# Patient Record
Sex: MALE | Race: WHITE
[De-identification: names, ages, dates, MRNs, and addresses within clinical notes are randomized per-mention and may not be internally consistent; named-entity substitution may affect disease eponyms.]

---

## 2018-02-03 ENCOUNTER — HOSPITAL ENCOUNTER (INPATIENT)
Dept: HOSPITAL 91 - TEL | Age: 33
LOS: 7 days | Discharge: HOME | DRG: 296 | End: 2018-02-10
Payer: COMMERCIAL

## 2018-02-03 DIAGNOSIS — E87.4: ICD-10-CM

## 2018-02-03 DIAGNOSIS — I49.01: ICD-10-CM

## 2018-02-03 DIAGNOSIS — F12.929: ICD-10-CM

## 2018-02-03 DIAGNOSIS — R73.9: ICD-10-CM

## 2018-02-03 DIAGNOSIS — F17.200: ICD-10-CM

## 2018-02-03 DIAGNOSIS — D69.6: ICD-10-CM

## 2018-02-03 DIAGNOSIS — F14.129: ICD-10-CM

## 2018-02-03 DIAGNOSIS — I42.7: ICD-10-CM

## 2018-02-03 DIAGNOSIS — E83.39: ICD-10-CM

## 2018-02-03 DIAGNOSIS — J96.01: ICD-10-CM

## 2018-02-03 DIAGNOSIS — I46.8: Primary | ICD-10-CM

## 2018-02-03 DIAGNOSIS — I50.21: ICD-10-CM
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
## 2018-02-03 DIAGNOSIS — N17.9: ICD-10-CM

## 2018-02-03 DIAGNOSIS — J69.0: ICD-10-CM

## 2018-02-03 DIAGNOSIS — R40.2432: ICD-10-CM

## 2018-02-03 DIAGNOSIS — F10.129: ICD-10-CM

## 2018-02-03 DIAGNOSIS — E83.42: ICD-10-CM

## 2018-02-03 DIAGNOSIS — I11.0: ICD-10-CM

## 2018-02-03 DIAGNOSIS — R41.3: ICD-10-CM

## 2018-02-03 PROCEDURE — 92526 ORAL FUNCTION THERAPY: CPT

## 2018-02-03 PROCEDURE — 94003 VENT MGMT INPAT SUBQ DAY: CPT

## 2018-02-03 PROCEDURE — 87340 HEPATITIS B SURFACE AG IA: CPT

## 2018-02-03 PROCEDURE — 93306 TTE W/DOPPLER COMPLETE: CPT

## 2018-02-03 PROCEDURE — 94664 DEMO&/EVAL PT USE INHALER: CPT

## 2018-02-03 PROCEDURE — 93005 ELECTROCARDIOGRAM TRACING: CPT

## 2018-02-03 PROCEDURE — 80306 DRUG TEST PRSMV INSTRMNT: CPT

## 2018-02-03 PROCEDURE — 97530 THERAPEUTIC ACTIVITIES: CPT

## 2018-02-03 PROCEDURE — 92610 EVALUATE SWALLOWING FUNCTION: CPT

## 2018-02-03 PROCEDURE — 87070 CULTURE OTHR SPECIMN AEROBIC: CPT

## 2018-02-03 PROCEDURE — 87040 BLOOD CULTURE FOR BACTERIA: CPT

## 2018-02-03 PROCEDURE — 89220 SPUTUM SPECIMEN COLLECTION: CPT

## 2018-02-03 PROCEDURE — 97116 GAIT TRAINING THERAPY: CPT

## 2018-02-03 PROCEDURE — 71045 X-RAY EXAM CHEST 1 VIEW: CPT

## 2018-02-03 PROCEDURE — 84132 ASSAY OF SERUM POTASSIUM: CPT

## 2018-02-03 PROCEDURE — 83036 HEMOGLOBIN GLYCOSYLATED A1C: CPT

## 2018-02-03 PROCEDURE — 31500 INSERT EMERGENCY AIRWAY: CPT

## 2018-02-03 PROCEDURE — 86703 HIV-1/HIV-2 1 RESULT ANTBDY: CPT

## 2018-02-03 PROCEDURE — 96374 THER/PROPH/DIAG INJ IV PUSH: CPT

## 2018-02-03 PROCEDURE — 81001 URINALYSIS AUTO W/SCOPE: CPT

## 2018-02-03 PROCEDURE — 80053 COMPREHEN METABOLIC PANEL: CPT

## 2018-02-03 PROCEDURE — 84100 ASSAY OF PHOSPHORUS: CPT

## 2018-02-03 PROCEDURE — 70450 CT HEAD/BRAIN W/O DYE: CPT

## 2018-02-03 PROCEDURE — 99291 CRITICAL CARE FIRST HOUR: CPT

## 2018-02-03 PROCEDURE — 5A12012 PERFORMANCE OF CARDIAC OUTPUT, SINGLE, MANUAL: ICD-10-PCS

## 2018-02-03 PROCEDURE — 82550 ASSAY OF CK (CPK): CPT

## 2018-02-03 PROCEDURE — 80307 DRUG TEST PRSMV CHEM ANLYZR: CPT

## 2018-02-03 PROCEDURE — 36415 COLL VENOUS BLD VENIPUNCTURE: CPT

## 2018-02-03 PROCEDURE — 86704 HEP B CORE ANTIBODY TOTAL: CPT

## 2018-02-03 PROCEDURE — 36600 WITHDRAWAL OF ARTERIAL BLOOD: CPT

## 2018-02-03 PROCEDURE — 97110 THERAPEUTIC EXERCISES: CPT

## 2018-02-03 PROCEDURE — 83735 ASSAY OF MAGNESIUM: CPT

## 2018-02-03 PROCEDURE — 80076 HEPATIC FUNCTION PANEL: CPT

## 2018-02-03 PROCEDURE — 80048 BASIC METABOLIC PNL TOTAL CA: CPT

## 2018-02-03 PROCEDURE — 97166 OT EVAL MOD COMPLEX 45 MIN: CPT

## 2018-02-03 PROCEDURE — 87081 CULTURE SCREEN ONLY: CPT

## 2018-02-03 PROCEDURE — 94770: CPT

## 2018-02-03 PROCEDURE — 96375 TX/PRO/DX INJ NEW DRUG ADDON: CPT

## 2018-02-03 PROCEDURE — 86803 HEPATITIS C AB TEST: CPT

## 2018-02-03 PROCEDURE — 76705 ECHO EXAM OF ABDOMEN: CPT

## 2018-02-03 PROCEDURE — 84484 ASSAY OF TROPONIN QUANT: CPT

## 2018-02-03 PROCEDURE — 94002 VENT MGMT INPAT INIT DAY: CPT

## 2018-02-03 PROCEDURE — 82803 BLOOD GASES ANY COMBINATION: CPT

## 2018-02-03 PROCEDURE — 85025 COMPLETE CBC W/AUTO DIFF WBC: CPT

## 2018-02-03 PROCEDURE — 97163 PT EVAL HIGH COMPLEX 45 MIN: CPT

## 2018-02-03 PROCEDURE — 82962 GLUCOSE BLOOD TEST: CPT

## 2018-02-03 PROCEDURE — 86706 HEP B SURFACE ANTIBODY: CPT

## 2018-02-03 PROCEDURE — 82553 CREATINE MB FRACTION: CPT

## 2018-02-03 PROCEDURE — 85730 THROMBOPLASTIN TIME PARTIAL: CPT

## 2018-02-03 PROCEDURE — 85610 PROTHROMBIN TIME: CPT

## 2018-02-03 PROCEDURE — 94640 AIRWAY INHALATION TREATMENT: CPT

## 2018-02-04 ENCOUNTER — HOSPITAL ENCOUNTER (INPATIENT)
Age: 33
LOS: 6 days | Discharge: HOME | DRG: 296 | End: 2018-02-10

## 2018-02-04 LAB
ABNORMAL IP MESSAGE: 1
ABNORMAL IP MESSAGE: 1
ACETAMINOPHEN: < 10 UG/ML (ref 10–30)
ADD MAN DIFF?: NO
ADD MAN DIFF?: NO
ADD UMIC: YES
ALANINE AMINOTRANSFERASE: 142 IU/L (ref 13–69)
ALANINE AMINOTRANSFERASE: 182 IU/L (ref 13–69)
ALANINE AMINOTRANSFERASE: 185 IU/L (ref 13–69)
ALANINE AMINOTRANSFERASE: 208 IU/L (ref 13–69)
ALBUMIN/GLOBULIN RATIO: 1.21
ALBUMIN/GLOBULIN RATIO: 1.36
ALBUMIN/GLOBULIN RATIO: 1.48
ALBUMIN/GLOBULIN RATIO: 1.53
ALBUMIN: 3.4 G/DL (ref 3.3–4.9)
ALBUMIN: 4 G/DL (ref 3.3–4.9)
ALBUMIN: 4 G/DL (ref 3.3–4.9)
ALBUMIN: 4.5 G/DL (ref 3.3–4.9)
ALKALINE PHOSPHATASE: 37 IU/L (ref 42–121)
ALKALINE PHOSPHATASE: 44 IU/L (ref 42–121)
ALKALINE PHOSPHATASE: 56 IU/L (ref 42–121)
ALKALINE PHOSPHATASE: 61 IU/L (ref 42–121)
ALLEN TEST: (no result)
ANION GAP: 17 (ref 8–16)
ANION GAP: 18 (ref 8–16)
ANION GAP: 18 (ref 8–16)
ANION GAP: 29 (ref 8–16)
ARTERIAL BASE EXCESS: -12.7 MMOL/L (ref -3–3)
ARTERIAL BASE EXCESS: -7.1 MMOL/L (ref -3–3)
ARTERIAL BASE EXCESS: -7.7 MMOL/L (ref -3–3)
ARTERIAL BASE EXCESS: -8.1 MMOL/L (ref -3–3)
ARTERIAL BLOOD GAS OXYGEN SAT: 97.4 MMHG (ref 95–98)
ARTERIAL BLOOD GAS OXYGEN SAT: 98.3 MMHG (ref 95–98)
ARTERIAL BLOOD GAS OXYGEN SAT: 99.3 MMHG (ref 95–98)
ARTERIAL BLOOD GAS OXYGEN SAT: 99.3 MMHG (ref 95–98)
ARTERIAL COHB: 0.3 % (ref 0–3)
ARTERIAL FRACTION OF OXYHGB: 96.8 % (ref 93–99)
ARTERIAL FRACTION OF OXYHGB: 97.8 % (ref 93–99)
ARTERIAL FRACTION OF OXYHGB: 98.7 % (ref 93–99)
ARTERIAL FRACTION OF OXYHGB: 98.7 % (ref 93–99)
ARTERIAL HCO3: 15.3 MMOL/L (ref 22–26)
ARTERIAL HCO3: 20.5 MMOL/L (ref 22–26)
ARTERIAL HCO3: 21.1 MMOL/L (ref 22–26)
ARTERIAL HCO3: 21.7 MMOL/L (ref 22–26)
ARTERIAL METHB: 0.2 % (ref 0–1.5)
ARTERIAL METHB: 0.3 % (ref 0–1.5)
ARTERIAL PCO2: 42.6 MMHG (ref 35–45)
ARTERIAL PCO2: 43.6 MMHG (ref 35–45)
ARTERIAL PCO2: 45 MMHG (ref 35–45)
ARTERIAL PCO2: 59.3 MMHG (ref 35–45)
ARTERIAL TOTAL HEMGLOBIN: 14.4 G/DL (ref 12–18)
ARTERIAL TOTAL HEMGLOBIN: 17 G/DL (ref 12–18)
ARTERIAL TOTAL HEMGLOBIN: 17.2 G/DL (ref 12–18)
ARTERIAL TOTAL HEMGLOBIN: 17.3 G/DL (ref 12–18)
ASPARTATE AMINO TRANSFERASE: 188 IU/L (ref 15–46)
ASPARTATE AMINO TRANSFERASE: 207 IU/L (ref 15–46)
ASPARTATE AMINO TRANSFERASE: 212 IU/L (ref 15–46)
ASPARTATE AMINO TRANSFERASE: 281 IU/L (ref 15–46)
BASOPHIL #: 0 10^3/UL (ref 0–0.1)
BASOPHIL #: 0 10^3/UL (ref 0–0.1)
BASOPHILS %: 0.3 % (ref 0–2)
BASOPHILS %: 0.4 % (ref 0–2)
BILIRUBIN,DIRECT: 0 MG/DL (ref 0–0.2)
BILIRUBIN,TOTAL: 0.1 MG/DL (ref 0.2–1.3)
BILIRUBIN,TOTAL: 0.1 MG/DL (ref 0.2–1.3)
BILIRUBIN,TOTAL: 0.3 MG/DL (ref 0.2–1.3)
BILIRUBIN,TOTAL: 0.4 MG/DL (ref 0.2–1.3)
BLOOD GAS HIGH PEEP SETTING: 26 CMH2O
BLOOD GAS LOW PEEP SETTING: 5 CMH2O
BLOOD GAS MEAN AIRWAY PRESSURE: 12
BLOOD GAS PIP: 26
BLOOD GAS PIP: 26
BLOOD GAS PIP: 35
BLOOD GAS TIDAL VOLUME: 600 ML
BLOOD UREA NITROGEN: 14 MG/DL (ref 7–20)
BLOOD UREA NITROGEN: 15 MG/DL (ref 7–20)
BLOOD UREA NITROGEN: 16 MG/DL (ref 7–20)
BLOOD UREA NITROGEN: 17 MG/DL (ref 7–20)
CALCIUM: 7.9 MG/DL (ref 8.4–10.2)
CALCIUM: 8.1 MG/DL (ref 8.4–10.2)
CALCIUM: 8.5 MG/DL (ref 8.4–10.2)
CALCIUM: 9.3 MG/DL (ref 8.4–10.2)
CARBON DIOXIDE: 19 MMOL/L (ref 21–31)
CARBON DIOXIDE: 22 MMOL/L (ref 21–31)
CARBON DIOXIDE: 24 MMOL/L (ref 21–31)
CARBON DIOXIDE: 24 MMOL/L (ref 21–31)
CHLORIDE: 101 MMOL/L (ref 97–110)
CHLORIDE: 105 MMOL/L (ref 97–110)
CHLORIDE: 110 MMOL/L (ref 97–110)
CHLORIDE: 111 MMOL/L (ref 97–110)
CK INDEX: 0.7
CK-MB: 2.08 NG/ML (ref 0–2.4)
CREATINE KINASE: 278 IU/L (ref 23–200)
CREATININE: 0.9 MG/DL (ref 0.61–1.24)
CREATININE: 0.96 MG/DL (ref 0.61–1.24)
CREATININE: 1.21 MG/DL (ref 0.61–1.24)
CREATININE: 1.49 MG/DL (ref 0.61–1.24)
EOSINOPHILS #: 0 10^3/UL (ref 0–0.5)
EOSINOPHILS #: 0.1 10^3/UL (ref 0–0.5)
EOSINOPHILS %: 0 % (ref 0–7)
EOSINOPHILS %: 0.8 % (ref 0–7)
ETHANOL: 144 MG/DL
FIO2: 100 %
GLOBULIN: 2.6 G/DL (ref 1.3–3.2)
GLOBULIN: 2.7 G/DL (ref 1.3–3.2)
GLOBULIN: 2.8 G/DL (ref 1.3–3.2)
GLOBULIN: 3.3 G/DL (ref 1.3–3.2)
GLUCOSE: 122 MG/DL (ref 70–220)
GLUCOSE: 136 MG/DL (ref 70–220)
GLUCOSE: 162 MG/DL (ref 70–220)
GLUCOSE: 180 MG/DL (ref 70–220)
HEMATOCRIT: 42.9 % (ref 42–52)
HEMATOCRIT: 50 % (ref 42–52)
HEMOGLOBIN: 14.1 G/DL (ref 14–18)
HEMOGLOBIN: 16.7 G/DL (ref 14–18)
HEPATITIS B SURFACE ANTIBODY: POSITIVE
HEPATITIS B SURFACE ANTIGEN: NEGATIVE
HEPATITIS C VIRAL ANTIBODY: NEGATIVE
HIV 1&2 ANTIBODY: NEGATIVE
INR: 1.03
LYMPHOCYTES #: 0.3 10^3/UL (ref 0.8–2.9)
LYMPHOCYTES #: 5.2 10^3/UL (ref 0.8–2.9)
LYMPHOCYTES %: 4.5 % (ref 15–51)
LYMPHOCYTES %: 53.3 % (ref 15–51)
MAGNESIUM: 1.4 MG/DL (ref 1.7–2.5)
MAGNESIUM: 1.5 MG/DL (ref 1.7–2.5)
MAGNESIUM: 1.7 MG/DL (ref 1.7–2.5)
MEAN CORPUSCULAR HEMOGLOBIN: 32.1 PG (ref 29–33)
MEAN CORPUSCULAR HEMOGLOBIN: 32.1 PG (ref 29–33)
MEAN CORPUSCULAR HGB CONC: 32.9 G/DL (ref 32–37)
MEAN CORPUSCULAR HGB CONC: 33.4 G/DL (ref 32–37)
MEAN CORPUSCULAR VOLUME: 96 FL (ref 82–101)
MEAN CORPUSCULAR VOLUME: 97.7 FL (ref 82–101)
MEAN PLATELET VOLUME: 10.1 FL (ref 7.4–10.4)
MEAN PLATELET VOLUME: 9.9 FL (ref 7.4–10.4)
MODE: (no result)
MONOCYTE #: 0.4 10^3/UL (ref 0.3–0.9)
MONOCYTE #: 0.4 10^3/UL (ref 0.3–0.9)
MONOCYTES %: 3.7 % (ref 0–11)
MONOCYTES %: 4.7 % (ref 0–11)
NEUTROPHIL #: 4 10^3/UL (ref 1.6–7.5)
NEUTROPHIL #: 6.8 10^3/UL (ref 1.6–7.5)
NEUTROPHILS %: 41.3 % (ref 39–77)
NEUTROPHILS %: 90.1 % (ref 39–77)
NUCLEATED RED BLOOD CELLS #: 0 10^3/UL (ref 0–0)
NUCLEATED RED BLOOD CELLS #: 0 10^3/UL (ref 0–0)
NUCLEATED RED BLOOD CELLS%: 0 /100WBC (ref 0–0)
NUCLEATED RED BLOOD CELLS%: 0 /100WBC (ref 0–0)
O2 A-A PPRESDIFF RESPIRATORY: 381.1 MMHG (ref 7–24)
O2 A-A PPRESDIFF RESPIRATORY: 443.2 MMHG (ref 7–24)
O2 A-A PPRESDIFF RESPIRATORY: 502.3 MMHG (ref 7–24)
O2 A-A PPRESDIFF RESPIRATORY: 533.9 MMHG (ref 7–24)
PARTIAL THROMBOPLASTIN TIME: 27.4 SEC (ref 25–35)
PHOSPHORUS: 3.4 MG/DL (ref 2.5–4.9)
PHOSPHORUS: 4.3 MG/DL (ref 2.5–4.9)
PLATELET COUNT: 210 10^3/UL (ref 140–415)
PLATELET COUNT: 214 10^3/UL (ref 140–415)
POSITIVE DIFF: (no result)
POSITIVE DIFF: (no result)
POTASSIUM: 3.2 MMOL/L (ref 3.5–5.1)
POTASSIUM: 3.7 MMOL/L (ref 3.5–5.1)
POTASSIUM: 3.8 MMOL/L (ref 3.5–5.1)
POTASSIUM: 4.6 MMOL/L (ref 3.5–5.1)
PROTIME: 13.6 SEC (ref 11.9–14.9)
PT RATIO: 1.1
RED BLOOD COUNT: 4.39 10^6/UL (ref 4.7–6.1)
RED BLOOD COUNT: 5.21 10^6/UL (ref 4.7–6.1)
RED CELL DISTRIBUTION WIDTH: 12 % (ref 11.5–14.5)
RED CELL DISTRIBUTION WIDTH: 12.1 % (ref 11.5–14.5)
SALICYLATE: < 1 MG/DL (ref 5–30)
SODIUM: 142 MMOL/L (ref 135–144)
SODIUM: 146 MMOL/L (ref 135–144)
SODIUM: 146 MMOL/L (ref 135–144)
SODIUM: 148 MMOL/L (ref 135–144)
TOTAL PROTEIN: 6.2 G/DL (ref 6.1–8.1)
TOTAL PROTEIN: 6.6 G/DL (ref 6.1–8.1)
TOTAL PROTEIN: 6.7 G/DL (ref 6.1–8.1)
TOTAL PROTEIN: 7.8 G/DL (ref 6.1–8.1)
TROPONIN-I: 0.02 NG/ML (ref 0–0.12)
TROPONIN-I: 0.03 NG/ML (ref 0–0.12)
UR ASCORBIC ACID: NEGATIVE MG/DL
UR BILIRUBIN (DIP): NEGATIVE MG/DL
UR BLOOD (DIP): (no result) MG/DL
UR CLARITY: (no result)
UR COLOR: YELLOW
UR GLUCOSE (DIP): (no result) MG/DL
UR KETONES (DIP): NEGATIVE MG/DL
UR LEUKOCYTE ESTERASE (DIP): NEGATIVE LEU/UL
UR MUCUS: (no result) /HPF
UR NITRITE (DIP): NEGATIVE MG/DL
UR PH (DIP): 7 (ref 5–9)
UR RBC: 43 /HPF (ref 0–5)
UR SPECIFIC GRAVITY (DIP): 1.01 (ref 1–1.03)
UR TOTAL PROTEIN (DIP): (no result) MG/DL
UR UROBILINOGEN (DIP): NEGATIVE MG/DL
UR WBC: 22 /HPF (ref 0–5)
WHITE BLOOD COUNT: 7.5 10^3/UL (ref 4.8–10.8)
WHITE BLOOD COUNT: 9.7 10^3/UL (ref 4.8–10.8)

## 2018-02-04 PROCEDURE — 5A1945Z RESPIRATORY VENTILATION, 24-96 CONSECUTIVE HOURS: ICD-10-PCS

## 2018-02-04 PROCEDURE — 0BH17EZ INSERTION OF ENDOTRACHEAL AIRWAY INTO TRACHEA, VIA NATURAL OR ARTIFICIAL OPENING: ICD-10-PCS

## 2018-02-04 PROCEDURE — 06HM33Z INSERTION OF INFUSION DEVICE INTO RIGHT FEMORAL VEIN, PERCUTANEOUS APPROACH: ICD-10-PCS

## 2018-02-04 RX ADMIN — CEFTRIAXONE 1 MLS/HR: 1 INJECTION, SOLUTION INTRAVENOUS at 01:00

## 2018-02-04 RX ADMIN — ASCORBIC ACID, VITAMIN A PALMITATE, CHOLECALCIFEROL, THIAMINE HYDROCHLORIDE, RIBOFLAVIN-5 PHOSPHATE SODIUM, PYRIDOXINE HYDROCHLORIDE, NIACINAMIDE, DEXPANTHENOL, ALPHA-TOCOPHEROL ACETATE, VITAMIN K1, FOLIC ACID, BIOTIN, CYANOCOBALAMIN 1 MLS/HR: 200; 3300; 200; 6; 3.6; 6; 40; 15; 10; 150; 600; 60; 5 INJECTION, SOLUTION INTRAVENOUS at 16:56

## 2018-02-04 RX ADMIN — CARBOXYMETHYLCELLULOSE SODIUM 1 DROP: 10 GEL OPHTHALMIC at 17:50

## 2018-02-04 RX ADMIN — SPIRONOLACTONE 1 MG: 25 TABLET, FILM COATED ORAL at 11:30

## 2018-02-04 RX ADMIN — VECURONIUM BROMIDE 1 MLS/HR: 1 INJECTION, POWDER, LYOPHILIZED, FOR SOLUTION INTRAVENOUS at 04:39

## 2018-02-04 RX ADMIN — PROPOFOL 1 MLS/HR: 10 INJECTION, EMULSION INTRAVENOUS at 01:35

## 2018-02-04 RX ADMIN — CEFEPIME 1 MLS/HR: 1 INJECTION, POWDER, FOR SOLUTION INTRAMUSCULAR; INTRAVENOUS at 21:39

## 2018-02-04 RX ADMIN — LIDOCAINE HYDROCHLORIDE 1 MLS/HR: 10 INJECTION, SOLUTION EPIDURAL; INFILTRATION; INTRACAUDAL; PERINEURAL at 00:00

## 2018-02-04 RX ADMIN — VASOPRESSIN 1 MLS/HR: 20 INJECTION, SOLUTION INTRAMUSCULAR; SUBCUTANEOUS at 20:06

## 2018-02-04 RX ADMIN — FAMOTIDINE 1 MG: 10 INJECTION, SOLUTION INTRAVENOUS at 21:38

## 2018-02-04 RX ADMIN — MINERAL OIL, PETROLATUM 1 APPLIC: 425; 568 OINTMENT OPHTHALMIC at 17:51

## 2018-02-04 RX ADMIN — MINERAL OIL, PETROLATUM 1 APPLIC: 425; 568 OINTMENT OPHTHALMIC at 12:00

## 2018-02-04 RX ADMIN — THIAMINE HYDROCHLORIDE 1 MLS/HR: 100 INJECTION, SOLUTION INTRAMUSCULAR; INTRAVENOUS at 08:41

## 2018-02-04 RX ADMIN — VECURONIUM BROMIDE 1 MG: 1 INJECTION, POWDER, LYOPHILIZED, FOR SOLUTION INTRAVENOUS at 03:53

## 2018-02-04 RX ADMIN — MINERAL OIL, PETROLATUM 1 APPLIC: 425; 568 OINTMENT OPHTHALMIC at 23:36

## 2018-02-04 RX ADMIN — VALSARTAN 1 MG: 80 TABLET, FILM COATED ORAL at 11:30

## 2018-02-04 RX ADMIN — PROPOFOL 1 MLS/HR: 10 INJECTION, EMULSION INTRAVENOUS at 22:34

## 2018-02-04 RX ADMIN — CARBOXYMETHYLCELLULOSE SODIUM 1 DROP: 10 GEL OPHTHALMIC at 23:35

## 2018-02-04 RX ADMIN — ASCORBIC ACID, VITAMIN A PALMITATE, CHOLECALCIFEROL, THIAMINE HYDROCHLORIDE, RIBOFLAVIN-5 PHOSPHATE SODIUM, PYRIDOXINE HYDROCHLORIDE, NIACINAMIDE, DEXPANTHENOL, ALPHA-TOCOPHEROL ACETATE, VITAMIN K1, FOLIC ACID, BIOTIN, CYANOCOBALAMIN 1 MLS/HR: 200; 3300; 200; 6; 3.6; 6; 40; 15; 10; 150; 600; 60; 5 INJECTION, SOLUTION INTRAVENOUS at 19:49

## 2018-02-04 RX ADMIN — THIAMINE HYDROCHLORIDE 1 MLS/HR: 100 INJECTION, SOLUTION INTRAMUSCULAR; INTRAVENOUS at 08:00

## 2018-02-04 RX ADMIN — VASOPRESSIN 1 MLS/HR: 20 INJECTION, SOLUTION INTRAMUSCULAR; SUBCUTANEOUS at 06:54

## 2018-02-04 RX ADMIN — CARBOXYMETHYLCELLULOSE SODIUM 1 DROP: 10 GEL OPHTHALMIC at 12:48

## 2018-02-04 RX ADMIN — FAMOTIDINE 1 MG: 10 INJECTION, SOLUTION INTRAVENOUS at 08:40

## 2018-02-04 RX ADMIN — MAGNESIUM SULFATE HEPTAHYDRATE 1 MLS/HR: 40 INJECTION, SOLUTION INTRAVENOUS at 19:44

## 2018-02-04 RX ADMIN — DILTIAZEM HYDROCHLORIDE 1 MG: 5 INJECTION INTRAVENOUS at 03:53

## 2018-02-04 RX ADMIN — CEFEPIME 1 MLS/HR: 1 INJECTION, POWDER, FOR SOLUTION INTRAMUSCULAR; INTRAVENOUS at 09:35

## 2018-02-04 RX ADMIN — CLINDAMYCIN IN 5 PERCENT DEXTROSE 1 MLS/HR: 18 INJECTION, SOLUTION INTRAVENOUS at 01:30

## 2018-02-04 RX ADMIN — ASCORBIC ACID, VITAMIN A PALMITATE, CHOLECALCIFEROL, THIAMINE HYDROCHLORIDE, RIBOFLAVIN-5 PHOSPHATE SODIUM, PYRIDOXINE HYDROCHLORIDE, NIACINAMIDE, DEXPANTHENOL, ALPHA-TOCOPHEROL ACETATE, VITAMIN K1, FOLIC ACID, BIOTIN, CYANOCOBALAMIN 1 MLS/HR: 200; 3300; 200; 6; 3.6; 6; 40; 15; 10; 150; 600; 60; 5 INJECTION, SOLUTION INTRAVENOUS at 07:59

## 2018-02-05 LAB
ADD MAN DIFF?: YES
ALANINE AMINOTRANSFERASE: 115 IU/L (ref 13–69)
ALANINE AMINOTRANSFERASE: 133 IU/L (ref 13–69)
ALANINE AMINOTRANSFERASE: 146 IU/L (ref 13–69)
ALANINE AMINOTRANSFERASE: 167 IU/L (ref 13–69)
ALBUMIN/GLOBULIN RATIO: 1.06
ALBUMIN/GLOBULIN RATIO: 1.24
ALBUMIN/GLOBULIN RATIO: 1.25
ALBUMIN/GLOBULIN RATIO: 1.33
ALBUMIN: 3.2 G/DL (ref 3.3–4.9)
ALBUMIN: 3.4 G/DL (ref 3.3–4.9)
ALBUMIN: 3.6 G/DL (ref 3.3–4.9)
ALBUMIN: 3.6 G/DL (ref 3.3–4.9)
ALKALINE PHOSPHATASE: 35 IU/L (ref 42–121)
ALKALINE PHOSPHATASE: 36 IU/L (ref 42–121)
ALKALINE PHOSPHATASE: 43 IU/L (ref 42–121)
ALKALINE PHOSPHATASE: 54 IU/L (ref 42–121)
ALLEN TEST: (no result)
ANION GAP: 12 (ref 8–16)
ANION GAP: 12 (ref 8–16)
ANION GAP: 13 (ref 8–16)
ANION GAP: 15 (ref 8–16)
ARTERIAL BASE EXCESS: -2.9 MMOL/L (ref -3–3)
ARTERIAL BASE EXCESS: -4.4 MMOL/L (ref -3–3)
ARTERIAL BASE EXCESS: 0.5 MMOL/L (ref -3–3)
ARTERIAL BLOOD GAS OXYGEN SAT: 96.5 MMHG (ref 95–98)
ARTERIAL BLOOD GAS OXYGEN SAT: 98.5 MMHG (ref 95–98)
ARTERIAL BLOOD GAS OXYGEN SAT: 98.7 MMHG (ref 95–98)
ARTERIAL COHB: 0.1 % (ref 0–3)
ARTERIAL COHB: 0.1 % (ref 0–3)
ARTERIAL COHB: 0.5 % (ref 0–3)
ARTERIAL FRACTION OF OXYHGB: 96.3 % (ref 93–99)
ARTERIAL FRACTION OF OXYHGB: 97.9 % (ref 93–99)
ARTERIAL FRACTION OF OXYHGB: 98.1 % (ref 93–99)
ARTERIAL HCO3: 23.4 MMOL/L (ref 22–26)
ARTERIAL HCO3: 24.4 MMOL/L (ref 22–26)
ARTERIAL HCO3: 26.4 MMOL/L (ref 22–26)
ARTERIAL METHB: 0.1 % (ref 0–1.5)
ARTERIAL METHB: 0.3 % (ref 0–1.5)
ARTERIAL METHB: 0.3 % (ref 0–1.5)
ARTERIAL PCO2: 40 MMHG (ref 35–45)
ARTERIAL PCO2: 46.7 MMHG (ref 35–45)
ARTERIAL PCO2: 49.4 MMHG (ref 35–45)
ARTERIAL TOTAL HEMGLOBIN: 15.9 G/DL (ref 12–18)
ARTERIAL TOTAL HEMGLOBIN: 16.1 G/DL (ref 12–18)
ARTERIAL TOTAL HEMGLOBIN: 17 G/DL (ref 12–18)
ASPARTATE AMINO TRANSFERASE: 133 IU/L (ref 15–46)
ASPARTATE AMINO TRANSFERASE: 142 IU/L (ref 15–46)
ASPARTATE AMINO TRANSFERASE: 89 IU/L (ref 15–46)
ASPARTATE AMINO TRANSFERASE: 94 IU/L (ref 15–46)
BAND NEUTROPHILS #M: 9.1 10^3/UL (ref 0–0.6)
BAND NEUTROPHILS % (M): 55 % (ref 0–4)
BILIRUBIN,DIRECT: 0 MG/DL (ref 0–0.2)
BILIRUBIN,TOTAL: 0.3 MG/DL (ref 0.2–1.3)
BLOOD GAS LOW PEEP SETTING: 5 CMH2O
BLOOD GAS PIP: 19
BLOOD GAS PIP: 26
BLOOD GAS TIDAL VOLUME: 600 ML
BLOOD UREA NITROGEN: 10 MG/DL (ref 7–20)
BLOOD UREA NITROGEN: 10 MG/DL (ref 7–20)
BLOOD UREA NITROGEN: 11 MG/DL (ref 7–20)
BLOOD UREA NITROGEN: 12 MG/DL (ref 7–20)
CALCIUM: 8.6 MG/DL (ref 8.4–10.2)
CALCIUM: 8.6 MG/DL (ref 8.4–10.2)
CALCIUM: 8.7 MG/DL (ref 8.4–10.2)
CALCIUM: 9 MG/DL (ref 8.4–10.2)
CARBON DIOXIDE: 26 MMOL/L (ref 21–31)
CARBON DIOXIDE: 27 MMOL/L (ref 21–31)
CARBON DIOXIDE: 29 MMOL/L (ref 21–31)
CARBON DIOXIDE: 29 MMOL/L (ref 21–31)
CHLORIDE: 104 MMOL/L (ref 97–110)
CHLORIDE: 104 MMOL/L (ref 97–110)
CHLORIDE: 105 MMOL/L (ref 97–110)
CHLORIDE: 106 MMOL/L (ref 97–110)
CREATININE: 0.58 MG/DL (ref 0.61–1.24)
CREATININE: 0.74 MG/DL (ref 0.61–1.24)
CREATININE: 0.76 MG/DL (ref 0.61–1.24)
CREATININE: 0.77 MG/DL (ref 0.61–1.24)
FIO2: 40 %
FIO2: 60 %
FIO2: 60 %
GIANT THROMBO% (M): 1 % (ref 0–0)
GLOBULIN: 2.7 G/DL (ref 1.3–3.2)
GLOBULIN: 2.7 G/DL (ref 1.3–3.2)
GLOBULIN: 2.9 G/DL (ref 1.3–3.2)
GLOBULIN: 3 G/DL (ref 1.3–3.2)
GLUCOSE: 123 MG/DL (ref 70–220)
GLUCOSE: 138 MG/DL (ref 70–220)
GLUCOSE: 88 MG/DL (ref 70–220)
GLUCOSE: 93 MG/DL (ref 70–220)
HEMATOCRIT: 47.5 % (ref 42–52)
HEMOGLOBIN A1C: 5 % (ref 0–5.9)
HEMOGLOBIN: 15.9 G/DL (ref 14–18)
LYMPHOCYTES #M: 0.5 10^3/UL (ref 0.8–2.9)
LYMPHOCYTES % (M): 3 % (ref 15–51)
MAGNESIUM: 1.6 MG/DL (ref 1.7–2.5)
MAGNESIUM: 1.6 MG/DL (ref 1.7–2.5)
MAGNESIUM: 1.9 MG/DL (ref 1.7–2.5)
MAGNESIUM: 1.9 MG/DL (ref 1.7–2.5)
MEAN CORPUSCULAR HEMOGLOBIN: 31.9 PG (ref 29–33)
MEAN CORPUSCULAR HGB CONC: 33.5 G/DL (ref 32–37)
MEAN CORPUSCULAR VOLUME: 95.4 FL (ref 82–101)
MEAN PLATELET VOLUME: 10.8 FL (ref 7.4–10.4)
METAMYELOCYTES #M: 0.1 10^3/UL (ref 0–0)
METAMYELOCYTES %M: 1 % (ref 0–0)
MODE: (no result)
MONOCYTE #M: 0.6 10^3/UL (ref 0.3–0.9)
MONOCYTES % (M): 4 % (ref 0–11)
NUCLEATED RED BLOOD CELLS%: 0 /100WBC (ref 0–0)
O2 A-A PPRESDIFF RESPIRATORY: 147.4 MMHG (ref 7–24)
O2 A-A PPRESDIFF RESPIRATORY: 255.1 MMHG (ref 7–24)
O2 A-A PPRESDIFF RESPIRATORY: 263.1 MMHG (ref 7–24)
PHOSPHORUS: 2.6 MG/DL (ref 2.5–4.9)
PHOSPHORUS: 3.2 MG/DL (ref 2.5–4.9)
PHOSPHORUS: 4.1 MG/DL (ref 2.5–4.9)
PHOSPHORUS: 4.6 MG/DL (ref 2.5–4.9)
PLATELET COUNT: 152 10^3/UL (ref 140–415)
PLATELET ESTIMATE: NORMAL
POIKILOCYTOSIS: (no result) (ref 0–0)
POSITIVE DIFF: (no result)
POTASSIUM: 4.7 MMOL/L (ref 3.5–5.1)
POTASSIUM: 5.1 MMOL/L (ref 3.5–5.1)
POTASSIUM: 5.2 MMOL/L (ref 3.5–5.1)
POTASSIUM: 5.4 MMOL/L (ref 3.5–5.1)
REACTIVE LYMPHOCYTES #M: 0.3 10^3/UL (ref 0–0)
REACTIVE LYMPHOCYTES% (M): 2 % (ref 0–0)
RED BLOOD COUNT: 4.98 10^6/UL (ref 4.7–6.1)
RED CELL DISTRIBUTION WIDTH: 12.5 % (ref 11.5–14.5)
SEG NEUT #M: 7.4 10^3/UL (ref 1.6–7.5)
SEGMENTED NEUTROPHILS (M) %: 35 % (ref 39–77)
SMUDGE%M: 9 % (ref 0–0)
SODIUM: 139 MMOL/L (ref 135–144)
SODIUM: 141 MMOL/L (ref 135–144)
TOTAL PROTEIN: 6.1 G/DL (ref 6.1–8.1)
TOTAL PROTEIN: 6.2 G/DL (ref 6.1–8.1)
TOTAL PROTEIN: 6.3 G/DL (ref 6.1–8.1)
TOTAL PROTEIN: 6.5 G/DL (ref 6.1–8.1)
WHITE BLOOD COUNT: 16.7 10^3/UL (ref 4.8–10.8)

## 2018-02-05 RX ADMIN — VECURONIUM BROMIDE 1 MLS/HR: 1 INJECTION, POWDER, LYOPHILIZED, FOR SOLUTION INTRAVENOUS at 05:16

## 2018-02-05 RX ADMIN — DEXTROSE MONOHYDRATE 1 ML: 500 INJECTION PARENTERAL at 13:37

## 2018-02-05 RX ADMIN — ACETAMINOPHEN 1 MG: 650 SUPPOSITORY RECTAL at 20:23

## 2018-02-05 RX ADMIN — FAMOTIDINE 1 MG: 20 TABLET ORAL at 20:41

## 2018-02-05 RX ADMIN — CEFEPIME 1 MLS/HR: 1 INJECTION, POWDER, FOR SOLUTION INTRAMUSCULAR; INTRAVENOUS at 08:55

## 2018-02-05 RX ADMIN — ASCORBIC ACID, VITAMIN A PALMITATE, CHOLECALCIFEROL, THIAMINE HYDROCHLORIDE, RIBOFLAVIN-5 PHOSPHATE SODIUM, PYRIDOXINE HYDROCHLORIDE, NIACINAMIDE, DEXPANTHENOL, ALPHA-TOCOPHEROL ACETATE, VITAMIN K1, FOLIC ACID, BIOTIN, CYANOCOBALAMIN 1 MLS/HR: 200; 3300; 200; 6; 3.6; 6; 40; 15; 10; 150; 600; 60; 5 INJECTION, SOLUTION INTRAVENOUS at 01:54

## 2018-02-05 RX ADMIN — DEXTROSE MONOHYDRATE 1 ML: 500 INJECTION PARENTERAL at 15:43

## 2018-02-05 RX ADMIN — VALSARTAN 1 MG: 80 TABLET, FILM COATED ORAL at 09:00

## 2018-02-05 RX ADMIN — SPIRONOLACTONE 1 MG: 25 TABLET, FILM COATED ORAL at 09:00

## 2018-02-05 RX ADMIN — CARBOXYMETHYLCELLULOSE SODIUM 1 DROP: 10 GEL OPHTHALMIC at 11:49

## 2018-02-05 RX ADMIN — CARBOXYMETHYLCELLULOSE SODIUM 1 DROP: 10 GEL OPHTHALMIC at 17:09

## 2018-02-05 RX ADMIN — PROPOFOL 1 MLS/HR: 10 INJECTION, EMULSION INTRAVENOUS at 08:33

## 2018-02-05 RX ADMIN — ACETAMINOPHEN 1 MG: 650 SUPPOSITORY RECTAL at 08:47

## 2018-02-05 RX ADMIN — FAMOTIDINE 1 MG: 10 INJECTION, SOLUTION INTRAVENOUS at 09:10

## 2018-02-05 RX ADMIN — ACETAMINOPHEN 1 MG: 160 SOLUTION ORAL at 14:30

## 2018-02-05 RX ADMIN — ACETAMINOPHEN 1 MG: 650 SUPPOSITORY RECTAL at 14:23

## 2018-02-05 RX ADMIN — MINERAL OIL, PETROLATUM 1 APPLIC: 425; 568 OINTMENT OPHTHALMIC at 11:49

## 2018-02-05 RX ADMIN — ACETAMINOPHEN 1 MG: 160 SOLUTION ORAL at 08:30

## 2018-02-05 RX ADMIN — Medication 1 MLS/HR: at 22:21

## 2018-02-05 RX ADMIN — AMPICILLIN SODIUM AND SULBACTAM SODIUM 1 MLS/HR: 2; 1 INJECTION, POWDER, FOR SOLUTION INTRAVENOUS at 17:27

## 2018-02-05 RX ADMIN — MINERAL OIL, PETROLATUM 1 APPLIC: 425; 568 OINTMENT OPHTHALMIC at 17:10

## 2018-02-05 RX ADMIN — PROPOFOL 1 MLS/HR: 10 INJECTION, EMULSION INTRAVENOUS at 14:57

## 2018-02-05 RX ADMIN — CARBOXYMETHYLCELLULOSE SODIUM 1 DROP: 10 GEL OPHTHALMIC at 05:14

## 2018-02-05 RX ADMIN — PROPOFOL 1 MLS/HR: 10 INJECTION, EMULSION INTRAVENOUS at 21:21

## 2018-02-05 RX ADMIN — ACETAMINOPHEN 1 MG: 160 SOLUTION ORAL at 20:30

## 2018-02-05 RX ADMIN — THIAMINE HYDROCHLORIDE 1 MLS/HR: 100 INJECTION, SOLUTION INTRAMUSCULAR; INTRAVENOUS at 09:16

## 2018-02-05 RX ADMIN — MINERAL OIL, PETROLATUM 1 APPLIC: 425; 568 OINTMENT OPHTHALMIC at 05:14

## 2018-02-05 RX ADMIN — MORPHINE SULFATE 1 MG: 2 INJECTION, SOLUTION INTRAMUSCULAR; INTRAVENOUS at 16:59

## 2018-02-05 RX ADMIN — ASCORBIC ACID, VITAMIN A PALMITATE, CHOLECALCIFEROL, THIAMINE HYDROCHLORIDE, RIBOFLAVIN-5 PHOSPHATE SODIUM, PYRIDOXINE HYDROCHLORIDE, NIACINAMIDE, DEXPANTHENOL, ALPHA-TOCOPHEROL ACETATE, VITAMIN K1, FOLIC ACID, BIOTIN, CYANOCOBALAMIN 1 MLS/HR: 200; 3300; 200; 6; 3.6; 6; 40; 15; 10; 150; 600; 60; 5 INJECTION, SOLUTION INTRAVENOUS at 11:13

## 2018-02-05 RX ADMIN — ASCORBIC ACID, VITAMIN A PALMITATE, CHOLECALCIFEROL, THIAMINE HYDROCHLORIDE, RIBOFLAVIN-5 PHOSPHATE SODIUM, PYRIDOXINE HYDROCHLORIDE, NIACINAMIDE, DEXPANTHENOL, ALPHA-TOCOPHEROL ACETATE, VITAMIN K1, FOLIC ACID, BIOTIN, CYANOCOBALAMIN 1 MLS/HR: 200; 3300; 200; 6; 3.6; 6; 40; 15; 10; 150; 600; 60; 5 INJECTION, SOLUTION INTRAVENOUS at 17:53

## 2018-02-06 LAB
ADD MAN DIFF?: YES
ALANINE AMINOTRANSFERASE: 106 IU/L (ref 13–69)
ALANINE AMINOTRANSFERASE: 94 IU/L (ref 13–69)
ALBUMIN/GLOBULIN RATIO: 1.03
ALBUMIN/GLOBULIN RATIO: 1.03
ALBUMIN: 2.8 G/DL (ref 3.3–4.9)
ALBUMIN: 3 G/DL (ref 3.3–4.9)
ALKALINE PHOSPHATASE: 42 IU/L (ref 42–121)
ALKALINE PHOSPHATASE: 43 IU/L (ref 42–121)
ALLEN TEST: (no result)
ANION GAP: 8 (ref 8–16)
ANION GAP: 9 (ref 8–16)
ARTERIAL BASE EXCESS: 5.2 MMOL/L (ref -3–3)
ARTERIAL BLOOD GAS OXYGEN SAT: 96.8 MMHG (ref 95–98)
ARTERIAL COHB: 0.4 % (ref 0–3)
ARTERIAL FRACTION OF OXYHGB: 96.2 % (ref 93–99)
ARTERIAL HCO3: 29.9 MMOL/L (ref 22–26)
ARTERIAL METHB: 0.2 % (ref 0–1.5)
ARTERIAL PCO2: 43.9 MMHG (ref 35–45)
ARTERIAL TOTAL HEMGLOBIN: 13.8 G/DL (ref 12–18)
ASPARTATE AMINO TRANSFERASE: 65 IU/L (ref 15–46)
ASPARTATE AMINO TRANSFERASE: 72 IU/L (ref 15–46)
BAND NEUTROPHILS #M: 3.6 10^3/UL (ref 0–0.6)
BAND NEUTROPHILS % (M): 20 % (ref 0–4)
BILIRUBIN,DIRECT: 0 MG/DL (ref 0–0.2)
BILIRUBIN,DIRECT: 0 MG/DL (ref 0–0.2)
BILIRUBIN,TOTAL: 0.1 MG/DL (ref 0.2–1.3)
BILIRUBIN,TOTAL: 0.2 MG/DL (ref 0.2–1.3)
BLOOD GAS LOW PEEP SETTING: 5 CMH2O
BLOOD GAS PS: 10
BLOOD UREA NITROGEN: 11 MG/DL (ref 7–20)
BLOOD UREA NITROGEN: 11 MG/DL (ref 7–20)
CALCIUM: 8.7 MG/DL (ref 8.4–10.2)
CALCIUM: 8.9 MG/DL (ref 8.4–10.2)
CARBON DIOXIDE: 30 MMOL/L (ref 21–31)
CARBON DIOXIDE: 30 MMOL/L (ref 21–31)
CHLORIDE: 106 MMOL/L (ref 97–110)
CHLORIDE: 106 MMOL/L (ref 97–110)
CREATININE: 0.91 MG/DL (ref 0.61–1.24)
CREATININE: 0.92 MG/DL (ref 0.61–1.24)
EOSINOPHILS % (M): 3 % (ref 0–7)
FIO2: 30 %
GIANT THROMBO% (M): 9 % (ref 0–0)
GLOBULIN: 2.7 G/DL (ref 1.3–3.2)
GLOBULIN: 2.9 G/DL (ref 1.3–3.2)
GLUCOSE: 107 MG/DL (ref 70–220)
GLUCOSE: 96 MG/DL (ref 70–220)
HEMATOCRIT: 37.1 % (ref 42–52)
HEMOGLOBIN: 12.5 G/DL (ref 14–18)
HEPATITIS B CORE ANTIBODY: NEGATIVE
LYMPHOCYTES #M: 5.6 10^3/UL (ref 0.8–2.9)
LYMPHOCYTES % (M): 31 % (ref 15–51)
MAGNESIUM: 1.5 MG/DL (ref 1.7–2.5)
MAGNESIUM: 1.5 MG/DL (ref 1.7–2.5)
MEAN CORPUSCULAR HEMOGLOBIN: 32.5 PG (ref 29–33)
MEAN CORPUSCULAR HGB CONC: 33.7 G/DL (ref 32–37)
MEAN CORPUSCULAR VOLUME: 96.4 FL (ref 82–101)
MEAN PLATELET VOLUME: 11.4 FL (ref 7.4–10.4)
MODE: (no result)
MONOCYTE #M: 0.3 10^3/UL (ref 0.3–0.9)
MONOCYTES % (M): 2 % (ref 0–11)
NUCLEATED RED BLOOD CELLS%: 0 /100WBC (ref 0–0)
O2 A-A PPRESDIFF RESPIRATORY: 76.3 MMHG (ref 7–24)
PHOSPHORUS: 2.3 MG/DL (ref 2.5–4.9)
PHOSPHORUS: 2.9 MG/DL (ref 2.5–4.9)
PLATELET COUNT: 126 10^3/UL (ref 140–415)
PLATELET ESTIMATE: (no result)
POIKILOCYTOSIS: (no result) (ref 0–0)
POSITIVE DIFF: (no result)
POTASSIUM: 4.2 MMOL/L (ref 3.5–5.1)
POTASSIUM: 4.4 MMOL/L (ref 3.5–5.1)
RED BLOOD COUNT: 3.85 10^6/UL (ref 4.7–6.1)
RED CELL DISTRIBUTION WIDTH: 12.8 % (ref 11.5–14.5)
SEG NEUT #M: 8.6 10^3/UL (ref 1.6–7.5)
SEGMENTED NEUTROPHILS (M) %: 44 % (ref 39–77)
SMUDGE%M: 47 % (ref 0–0)
SODIUM: 140 MMOL/L (ref 135–144)
SODIUM: 141 MMOL/L (ref 135–144)
TOTAL PROTEIN: 5.5 G/DL (ref 6.1–8.1)
TOTAL PROTEIN: 5.9 G/DL (ref 6.1–8.1)
WHITE BLOOD COUNT: 18.1 10^3/UL (ref 4.8–10.8)

## 2018-02-06 RX ADMIN — ACETAMINOPHEN 1 MG: 160 SOLUTION ORAL at 08:40

## 2018-02-06 RX ADMIN — CARBOXYMETHYLCELLULOSE SODIUM 1 DROP: 10 GEL OPHTHALMIC at 05:34

## 2018-02-06 RX ADMIN — ASCORBIC ACID, VITAMIN A PALMITATE, CHOLECALCIFEROL, THIAMINE HYDROCHLORIDE, RIBOFLAVIN-5 PHOSPHATE SODIUM, PYRIDOXINE HYDROCHLORIDE, NIACINAMIDE, DEXPANTHENOL, ALPHA-TOCOPHEROL ACETATE, VITAMIN K1, FOLIC ACID, BIOTIN, CYANOCOBALAMIN 1 MLS/HR: 200; 3300; 200; 6; 3.6; 6; 40; 15; 10; 150; 600; 60; 5 INJECTION, SOLUTION INTRAVENOUS at 10:40

## 2018-02-06 RX ADMIN — FAMOTIDINE 1 MG: 20 TABLET ORAL at 08:42

## 2018-02-06 RX ADMIN — VALSARTAN 1 MG: 80 TABLET, FILM COATED ORAL at 08:42

## 2018-02-06 RX ADMIN — AMPICILLIN SODIUM AND SULBACTAM SODIUM 1 MLS/HR: 2; 1 INJECTION, POWDER, FOR SOLUTION INTRAVENOUS at 05:35

## 2018-02-06 RX ADMIN — VALSARTAN 1 MG: 80 TABLET, FILM COATED ORAL at 21:17

## 2018-02-06 RX ADMIN — MINERAL OIL, PETROLATUM 1 APPLIC: 425; 568 OINTMENT OPHTHALMIC at 00:22

## 2018-02-06 RX ADMIN — CARBOXYMETHYLCELLULOSE SODIUM 1 DROP: 10 GEL OPHTHALMIC at 00:21

## 2018-02-06 RX ADMIN — AMPICILLIN SODIUM AND SULBACTAM SODIUM 1 MLS/HR: 2; 1 INJECTION, POWDER, FOR SOLUTION INTRAVENOUS at 23:32

## 2018-02-06 RX ADMIN — PROPOFOL 1 MLS/HR: 10 INJECTION, EMULSION INTRAVENOUS at 01:44

## 2018-02-06 RX ADMIN — MAGNESIUM SULFATE HEPTAHYDRATE 1 MLS/HR: 40 INJECTION, SOLUTION INTRAVENOUS at 18:10

## 2018-02-06 RX ADMIN — INSULIN ASPART 1 UNIT: 100 INJECTION, SOLUTION INTRAVENOUS; SUBCUTANEOUS at 21:00

## 2018-02-06 RX ADMIN — AMPICILLIN SODIUM AND SULBACTAM SODIUM 1 MLS/HR: 2; 1 INJECTION, POWDER, FOR SOLUTION INTRAVENOUS at 18:15

## 2018-02-06 RX ADMIN — INSULIN ASPART 1 UNIT: 100 INJECTION, SOLUTION INTRAVENOUS; SUBCUTANEOUS at 17:35

## 2018-02-06 RX ADMIN — ASCORBIC ACID, VITAMIN A PALMITATE, CHOLECALCIFEROL, THIAMINE HYDROCHLORIDE, RIBOFLAVIN-5 PHOSPHATE SODIUM, PYRIDOXINE HYDROCHLORIDE, NIACINAMIDE, DEXPANTHENOL, ALPHA-TOCOPHEROL ACETATE, VITAMIN K1, FOLIC ACID, BIOTIN, CYANOCOBALAMIN 1 MLS/HR: 200; 3300; 200; 6; 3.6; 6; 40; 15; 10; 150; 600; 60; 5 INJECTION, SOLUTION INTRAVENOUS at 02:45

## 2018-02-06 RX ADMIN — AMPICILLIN SODIUM AND SULBACTAM SODIUM 1 MLS/HR: 2; 1 INJECTION, POWDER, FOR SOLUTION INTRAVENOUS at 12:49

## 2018-02-06 RX ADMIN — ACETAMINOPHEN 1 MG: 650 SUPPOSITORY RECTAL at 02:30

## 2018-02-06 RX ADMIN — ACETAMINOPHEN 1 MG: 160 SOLUTION ORAL at 02:44

## 2018-02-06 RX ADMIN — SPIRONOLACTONE 1 MG: 25 TABLET, FILM COATED ORAL at 08:42

## 2018-02-06 RX ADMIN — ACETAMINOPHEN 1 MG: 160 SOLUTION ORAL at 21:17

## 2018-02-06 RX ADMIN — THIAMINE HYDROCHLORIDE 1 MLS/HR: 100 INJECTION, SOLUTION INTRAMUSCULAR; INTRAVENOUS at 15:01

## 2018-02-06 RX ADMIN — DEXTROSE MONOHYDRATE 1 ML: 500 INJECTION PARENTERAL at 01:29

## 2018-02-06 RX ADMIN — MINERAL OIL, PETROLATUM 1 APPLIC: 425; 568 OINTMENT OPHTHALMIC at 05:34

## 2018-02-06 RX ADMIN — AMPICILLIN SODIUM AND SULBACTAM SODIUM 1 MLS/HR: 2; 1 INJECTION, POWDER, FOR SOLUTION INTRAVENOUS at 00:21

## 2018-02-06 RX ADMIN — FAMOTIDINE 1 MG: 20 TABLET ORAL at 01:14

## 2018-02-07 LAB
ADD MAN DIFF?: NO
ALANINE AMINOTRANSFERASE: 70 IU/L (ref 13–69)
ALBUMIN/GLOBULIN RATIO: 0.96
ALBUMIN: 2.8 G/DL (ref 3.3–4.9)
ALKALINE PHOSPHATASE: 40 IU/L (ref 42–121)
ANION GAP: 9 (ref 8–16)
ASPARTATE AMINO TRANSFERASE: 47 IU/L (ref 15–46)
BASOPHIL #: 0 10^3/UL (ref 0–0.1)
BASOPHILS %: 0.2 % (ref 0–2)
BILIRUBIN,DIRECT: 0 MG/DL (ref 0–0.2)
BILIRUBIN,TOTAL: 0.6 MG/DL (ref 0.2–1.3)
BLOOD UREA NITROGEN: 12 MG/DL (ref 7–20)
CALCIUM: 8.7 MG/DL (ref 8.4–10.2)
CARBON DIOXIDE: 28 MMOL/L (ref 21–31)
CHLORIDE: 109 MMOL/L (ref 97–110)
CREATININE: 0.8 MG/DL (ref 0.61–1.24)
EOSINOPHILS #: 0.1 10^3/UL (ref 0–0.5)
EOSINOPHILS %: 0.4 % (ref 0–7)
GLOBULIN: 2.9 G/DL (ref 1.3–3.2)
GLUCOSE: 90 MG/DL (ref 70–220)
HEMATOCRIT: 34.6 % (ref 42–52)
HEMOGLOBIN: 11.6 G/DL (ref 14–18)
LYMPHOCYTES #: 2 10^3/UL (ref 0.8–2.9)
LYMPHOCYTES %: 12.4 % (ref 15–51)
MAGNESIUM: 1.9 MG/DL (ref 1.7–2.5)
MAGNESIUM: 2.1 MG/DL (ref 1.7–2.5)
MEAN CORPUSCULAR HEMOGLOBIN: 32 PG (ref 29–33)
MEAN CORPUSCULAR HGB CONC: 33.5 G/DL (ref 32–37)
MEAN CORPUSCULAR VOLUME: 95.3 FL (ref 82–101)
MEAN PLATELET VOLUME: 11.1 FL (ref 7.4–10.4)
MONOCYTE #: 0.4 10^3/UL (ref 0.3–0.9)
MONOCYTES %: 2.5 % (ref 0–11)
NEUTROPHIL #: 13.1 10^3/UL (ref 1.6–7.5)
NEUTROPHILS %: 82.7 % (ref 39–77)
NUCLEATED RED BLOOD CELLS #: 0 10^3/UL (ref 0–0)
NUCLEATED RED BLOOD CELLS%: 0 /100WBC (ref 0–0)
PHOSPHORUS: 1.3 MG/DL (ref 2.5–4.9)
PLATELET COUNT: 105 10^3/UL (ref 140–415)
POSITIVE DIFF: (no result)
POTASSIUM: 3.5 MMOL/L (ref 3.5–5.1)
POTASSIUM: 4.4 MMOL/L (ref 3.5–5.1)
RED BLOOD COUNT: 3.63 10^6/UL (ref 4.7–6.1)
RED CELL DISTRIBUTION WIDTH: 12.6 % (ref 11.5–14.5)
SODIUM: 142 MMOL/L (ref 135–144)
TOTAL PROTEIN: 5.7 G/DL (ref 6.1–8.1)
WHITE BLOOD COUNT: 15.8 10^3/UL (ref 4.8–10.8)

## 2018-02-07 RX ADMIN — POTASSIUM PHOSPHATE, MONOBASIC AND POTASSIUM PHOSPHATE, DIBASIC 1 MLS/HR: 224; 236 INJECTION, SOLUTION, CONCENTRATE INTRAVENOUS at 13:03

## 2018-02-07 RX ADMIN — ASCORBIC ACID, VITAMIN A PALMITATE, CHOLECALCIFEROL, THIAMINE HYDROCHLORIDE, RIBOFLAVIN-5 PHOSPHATE SODIUM, PYRIDOXINE HYDROCHLORIDE, NIACINAMIDE, DEXPANTHENOL, ALPHA-TOCOPHEROL ACETATE, VITAMIN K1, FOLIC ACID, BIOTIN, CYANOCOBALAMIN 1 MLS/HR: 200; 3300; 200; 6; 3.6; 6; 40; 15; 10; 150; 600; 60; 5 INJECTION, SOLUTION INTRAVENOUS at 11:40

## 2018-02-07 RX ADMIN — PANTOPRAZOLE SODIUM 1 MG: 40 TABLET, DELAYED RELEASE ORAL at 06:37

## 2018-02-07 RX ADMIN — SPIRONOLACTONE 1 MG: 25 TABLET, FILM COATED ORAL at 10:47

## 2018-02-07 RX ADMIN — AMOXICILLIN AND CLAVULANATE POTASSIUM 1 MG: 875; 125 TABLET, FILM COATED ORAL at 21:29

## 2018-02-07 RX ADMIN — ASCORBIC ACID, VITAMIN A PALMITATE, CHOLECALCIFEROL, THIAMINE HYDROCHLORIDE, RIBOFLAVIN-5 PHOSPHATE SODIUM, PYRIDOXINE HYDROCHLORIDE, NIACINAMIDE, DEXPANTHENOL, ALPHA-TOCOPHEROL ACETATE, VITAMIN K1, FOLIC ACID, BIOTIN, CYANOCOBALAMIN 1 MLS/HR: 200; 3300; 200; 6; 3.6; 6; 40; 15; 10; 150; 600; 60; 5 INJECTION, SOLUTION INTRAVENOUS at 03:20

## 2018-02-07 RX ADMIN — AMPICILLIN SODIUM AND SULBACTAM SODIUM 1 MLS/HR: 2; 1 INJECTION, POWDER, FOR SOLUTION INTRAVENOUS at 13:02

## 2018-02-07 RX ADMIN — FUROSEMIDE 1 MG: 10 INJECTION, SOLUTION INTRAVENOUS at 12:59

## 2018-02-07 RX ADMIN — ASCORBIC ACID, VITAMIN A PALMITATE, CHOLECALCIFEROL, THIAMINE HYDROCHLORIDE, RIBOFLAVIN-5 PHOSPHATE SODIUM, PYRIDOXINE HYDROCHLORIDE, NIACINAMIDE, DEXPANTHENOL, ALPHA-TOCOPHEROL ACETATE, VITAMIN K1, FOLIC ACID, BIOTIN, CYANOCOBALAMIN 1 MLS/HR: 200; 3300; 200; 6; 3.6; 6; 40; 15; 10; 150; 600; 60; 5 INJECTION, SOLUTION INTRAVENOUS at 01:33

## 2018-02-07 RX ADMIN — VALSARTAN 1 MG: 80 TABLET, FILM COATED ORAL at 21:30

## 2018-02-07 RX ADMIN — VALSARTAN 1 MG: 80 TABLET, FILM COATED ORAL at 10:47

## 2018-02-07 RX ADMIN — INSULIN ASPART 1 UNIT: 100 INJECTION, SOLUTION INTRAVENOUS; SUBCUTANEOUS at 21:00

## 2018-02-07 RX ADMIN — INSULIN ASPART 1 UNIT: 100 INJECTION, SOLUTION INTRAVENOUS; SUBCUTANEOUS at 07:35

## 2018-02-07 RX ADMIN — POTASSIUM CHLORIDE 1 MEQ: 1500 TABLET, EXTENDED RELEASE ORAL at 03:17

## 2018-02-07 RX ADMIN — MAGNESIUM SULFATE HEPTAHYDRATE 1 MLS/HR: 1 INJECTION, SOLUTION INTRAVENOUS at 01:05

## 2018-02-07 RX ADMIN — AMPICILLIN SODIUM AND SULBACTAM SODIUM 1 MLS/HR: 2; 1 INJECTION, POWDER, FOR SOLUTION INTRAVENOUS at 06:36

## 2018-02-07 RX ADMIN — INSULIN ASPART 1 UNIT: 100 INJECTION, SOLUTION INTRAVENOUS; SUBCUTANEOUS at 11:30

## 2018-02-07 RX ADMIN — INSULIN ASPART 1 UNIT: 100 INJECTION, SOLUTION INTRAVENOUS; SUBCUTANEOUS at 17:55

## 2018-02-08 LAB
ADD MAN DIFF?: NO
ALANINE AMINOTRANSFERASE: 57 IU/L (ref 13–69)
ALBUMIN: 3.1 G/DL (ref 3.3–4.9)
ALKALINE PHOSPHATASE: 51 IU/L (ref 42–121)
ANION GAP: 13 (ref 8–16)
ASPARTATE AMINO TRANSFERASE: 37 IU/L (ref 15–46)
BASOPHIL #: 0 10^3/UL (ref 0–0.1)
BASOPHILS %: 0.2 % (ref 0–2)
BILIRUBIN,DIRECT: 0 MG/DL (ref 0–0.2)
BILIRUBIN,TOTAL: 0.8 MG/DL (ref 0.2–1.3)
BLOOD UREA NITROGEN: 14 MG/DL (ref 7–20)
CALCIUM: 8.7 MG/DL (ref 8.4–10.2)
CARBON DIOXIDE: 26 MMOL/L (ref 21–31)
CHLORIDE: 105 MMOL/L (ref 97–110)
CREATININE: 0.73 MG/DL (ref 0.61–1.24)
EOSINOPHILS #: 0.1 10^3/UL (ref 0–0.5)
EOSINOPHILS %: 1 % (ref 0–7)
GLUCOSE: 89 MG/DL (ref 70–220)
HEMATOCRIT: 34.6 % (ref 42–52)
HEMOGLOBIN: 11.9 G/DL (ref 14–18)
LYMPHOCYTES #: 1.6 10^3/UL (ref 0.8–2.9)
LYMPHOCYTES %: 13 % (ref 15–51)
MAGNESIUM: 1.6 MG/DL (ref 1.7–2.5)
MEAN CORPUSCULAR HEMOGLOBIN: 31.6 PG (ref 29–33)
MEAN CORPUSCULAR HGB CONC: 34.4 G/DL (ref 32–37)
MEAN CORPUSCULAR VOLUME: 91.8 FL (ref 82–101)
MEAN PLATELET VOLUME: 10.8 FL (ref 7.4–10.4)
MONOCYTE #: 0.8 10^3/UL (ref 0.3–0.9)
MONOCYTES %: 6.3 % (ref 0–11)
NEUTROPHIL #: 9.5 10^3/UL (ref 1.6–7.5)
NEUTROPHILS %: 79 % (ref 39–77)
NUCLEATED RED BLOOD CELLS #: 0 10^3/UL (ref 0–0)
NUCLEATED RED BLOOD CELLS%: 0 /100WBC (ref 0–0)
PHOSPHORUS: 3.1 MG/DL (ref 2.5–4.9)
PLATELET COUNT: 127 10^3/UL (ref 140–415)
POSITIVE DIFF: (no result)
POTASSIUM: 3.6 MMOL/L (ref 3.5–5.1)
RED BLOOD COUNT: 3.77 10^6/UL (ref 4.7–6.1)
RED CELL DISTRIBUTION WIDTH: 11.9 % (ref 11.5–14.5)
SODIUM: 140 MMOL/L (ref 135–144)
TOTAL PROTEIN: 5.9 G/DL (ref 6.1–8.1)
WHITE BLOOD COUNT: 12 10^3/UL (ref 4.8–10.8)

## 2018-02-08 RX ADMIN — SPIRONOLACTONE 1 MG: 25 TABLET, FILM COATED ORAL at 08:40

## 2018-02-08 RX ADMIN — VALSARTAN 1 MG: 80 TABLET, FILM COATED ORAL at 08:41

## 2018-02-08 RX ADMIN — AMOXICILLIN AND CLAVULANATE POTASSIUM 1 MG: 875; 125 TABLET, FILM COATED ORAL at 08:40

## 2018-02-08 RX ADMIN — THIAMINE HCL TAB 100 MG 1 MG: 100 TAB at 08:41

## 2018-02-08 RX ADMIN — GUAIFENESIN 1 MG: 600 TABLET, EXTENDED RELEASE ORAL at 20:37

## 2018-02-08 RX ADMIN — Medication 1 MG: at 20:25

## 2018-02-08 RX ADMIN — AMOXICILLIN AND CLAVULANATE POTASSIUM 1 MG: 875; 125 TABLET, FILM COATED ORAL at 22:41

## 2018-02-08 RX ADMIN — FOLIC ACID 1 MG: 1 TABLET ORAL at 08:40

## 2018-02-08 RX ADMIN — THERA TABS 1 TAB: TAB at 08:40

## 2018-02-08 RX ADMIN — MAGNESIUM SULFATE HEPTAHYDRATE 1 MLS/HR: 40 INJECTION, SOLUTION INTRAVENOUS at 10:47

## 2018-02-08 RX ADMIN — INSULIN ASPART 1 UNIT: 100 INJECTION, SOLUTION INTRAVENOUS; SUBCUTANEOUS at 07:55

## 2018-02-08 RX ADMIN — INSULIN ASPART 1 UNIT: 100 INJECTION, SOLUTION INTRAVENOUS; SUBCUTANEOUS at 11:50

## 2018-02-08 RX ADMIN — Medication 1 MG: at 10:47

## 2018-02-08 RX ADMIN — VALSARTAN 1 MG: 80 TABLET, FILM COATED ORAL at 20:26

## 2018-02-08 RX ADMIN — FUROSEMIDE 1 MG: 10 INJECTION, SOLUTION INTRAVENOUS at 08:41

## 2018-02-09 LAB
ADD MAN DIFF?: NO
ADD UMIC: YES
ANION GAP: 11 (ref 8–16)
BASOPHIL #: 0 10^3/UL (ref 0–0.1)
BASOPHILS %: 0.4 % (ref 0–2)
BLOOD UREA NITROGEN: 14 MG/DL (ref 7–20)
CALCIUM: 9 MG/DL (ref 8.4–10.2)
CARBON DIOXIDE: 28 MMOL/L (ref 21–31)
CHLORIDE: 103 MMOL/L (ref 97–110)
CREATININE: 0.73 MG/DL (ref 0.61–1.24)
EOSINOPHILS #: 0.2 10^3/UL (ref 0–0.5)
EOSINOPHILS %: 1.9 % (ref 0–7)
GLUCOSE: 94 MG/DL (ref 70–220)
HEMATOCRIT: 35.8 % (ref 42–52)
HEMOGLOBIN: 12.4 G/DL (ref 14–18)
LYMPHOCYTES #: 2 10^3/UL (ref 0.8–2.9)
LYMPHOCYTES %: 19.1 % (ref 15–51)
MAGNESIUM: 1.8 MG/DL (ref 1.7–2.5)
MEAN CORPUSCULAR HEMOGLOBIN: 31.7 PG (ref 29–33)
MEAN CORPUSCULAR HGB CONC: 34.6 G/DL (ref 32–37)
MEAN CORPUSCULAR VOLUME: 91.6 FL (ref 82–101)
MEAN PLATELET VOLUME: 11.2 FL (ref 7.4–10.4)
MONOCYTE #: 1.3 10^3/UL (ref 0.3–0.9)
MONOCYTES %: 12.4 % (ref 0–11)
NEUTROPHIL #: 6.6 10^3/UL (ref 1.6–7.5)
NEUTROPHILS %: 65.1 % (ref 39–77)
NUCLEATED RED BLOOD CELLS #: 0 10^3/UL (ref 0–0)
NUCLEATED RED BLOOD CELLS%: 0 /100WBC (ref 0–0)
PHOSPHORUS: 4.4 MG/DL (ref 2.5–4.9)
PLATELET COUNT: 143 10^3/UL (ref 140–415)
POTASSIUM: 3.8 MMOL/L (ref 3.5–5.1)
RED BLOOD COUNT: 3.91 10^6/UL (ref 4.7–6.1)
RED CELL DISTRIBUTION WIDTH: 11.9 % (ref 11.5–14.5)
SODIUM: 138 MMOL/L (ref 135–144)
UR ASCORBIC ACID: NEGATIVE MG/DL
UR BILIRUBIN (DIP): NEGATIVE MG/DL
UR BLOOD (DIP): (no result) MG/DL
UR CLARITY: CLEAR
UR COLOR: YELLOW
UR GLUCOSE (DIP): NEGATIVE MG/DL
UR KETONES (DIP): NEGATIVE MG/DL
UR LEUKOCYTE ESTERASE (DIP): NEGATIVE LEU/UL
UR NITRITE (DIP): NEGATIVE MG/DL
UR PH (DIP): 8 (ref 5–9)
UR RBC: 0 /HPF (ref 0–5)
UR SPECIFIC GRAVITY (DIP): 1.01 (ref 1–1.03)
UR TOTAL PROTEIN (DIP): NEGATIVE MG/DL
UR UROBILINOGEN (DIP): NEGATIVE MG/DL
UR WBC: 2 /HPF (ref 0–5)
WHITE BLOOD COUNT: 10.2 10^3/UL (ref 4.8–10.8)

## 2018-02-09 RX ADMIN — IPRATROPIUM BROMIDE AND ALBUTEROL SULFATE 1 ML: .5; 3 SOLUTION RESPIRATORY (INHALATION) at 19:52

## 2018-02-09 RX ADMIN — Medication 1 MG: at 21:46

## 2018-02-09 RX ADMIN — SPIRONOLACTONE 1 MG: 25 TABLET, FILM COATED ORAL at 08:31

## 2018-02-09 RX ADMIN — THERA TABS 1 TAB: TAB at 08:31

## 2018-02-09 RX ADMIN — Medication 1 MG: at 08:32

## 2018-02-09 RX ADMIN — VALSARTAN 1 MG: 80 TABLET, FILM COATED ORAL at 08:33

## 2018-02-09 RX ADMIN — METHYLPREDNISOLONE SODIUM SUCCINATE 1 MG: 40 INJECTION, POWDER, FOR SOLUTION INTRAMUSCULAR; INTRAVENOUS at 14:51

## 2018-02-09 RX ADMIN — FOLIC ACID 1 MG: 1 TABLET ORAL at 08:31

## 2018-02-09 RX ADMIN — AMOXICILLIN AND CLAVULANATE POTASSIUM 1 MG: 875; 125 TABLET, FILM COATED ORAL at 08:31

## 2018-02-09 RX ADMIN — THIAMINE HCL TAB 100 MG 1 MG: 100 TAB at 08:31

## 2018-02-09 RX ADMIN — METHYLPREDNISOLONE SODIUM SUCCINATE 1 MG: 40 INJECTION, POWDER, FOR SOLUTION INTRAMUSCULAR; INTRAVENOUS at 21:57

## 2018-02-09 RX ADMIN — LEVOFLOXACIN 1 MLS/HR: 500 INJECTION, SOLUTION INTRAVENOUS at 14:51

## 2018-02-09 RX ADMIN — IPRATROPIUM BROMIDE AND ALBUTEROL SULFATE 1 ML: .5; 3 SOLUTION RESPIRATORY (INHALATION) at 14:33

## 2018-02-09 RX ADMIN — AMOXICILLIN AND CLAVULANATE POTASSIUM 1 MG: 875; 125 TABLET, FILM COATED ORAL at 20:34

## 2018-02-09 RX ADMIN — VALSARTAN 1 MG: 80 TABLET, FILM COATED ORAL at 20:34

## 2018-02-10 LAB
ADD MAN DIFF?: NO
ANION GAP: 13 (ref 8–16)
BASOPHIL #: 0 10^3/UL (ref 0–0.1)
BASOPHILS %: 0.3 % (ref 0–2)
BLOOD UREA NITROGEN: 20 MG/DL (ref 7–20)
CALCIUM: 9.3 MG/DL (ref 8.4–10.2)
CARBON DIOXIDE: 27 MMOL/L (ref 21–31)
CHLORIDE: 102 MMOL/L (ref 97–110)
CREATININE: 0.77 MG/DL (ref 0.61–1.24)
EOSINOPHILS #: 0 10^3/UL (ref 0–0.5)
EOSINOPHILS %: 0 % (ref 0–7)
GLUCOSE: 121 MG/DL (ref 70–220)
HEMATOCRIT: 38 % (ref 42–52)
HEMOGLOBIN: 13.2 G/DL (ref 14–18)
LYMPHOCYTES #: 1.3 10^3/UL (ref 0.8–2.9)
LYMPHOCYTES %: 20 % (ref 15–51)
MAGNESIUM: 2 MG/DL (ref 1.7–2.5)
MEAN CORPUSCULAR HEMOGLOBIN: 31.9 PG (ref 29–33)
MEAN CORPUSCULAR HGB CONC: 34.7 G/DL (ref 32–37)
MEAN CORPUSCULAR VOLUME: 91.8 FL (ref 82–101)
MEAN PLATELET VOLUME: 10.9 FL (ref 7.4–10.4)
MONOCYTE #: 0.7 10^3/UL (ref 0.3–0.9)
MONOCYTES %: 10 % (ref 0–11)
NEUTROPHIL #: 4.5 10^3/UL (ref 1.6–7.5)
NEUTROPHILS %: 68.5 % (ref 39–77)
NUCLEATED RED BLOOD CELLS #: 0 10^3/UL (ref 0–0)
NUCLEATED RED BLOOD CELLS%: 0 /100WBC (ref 0–0)
PLATELET COUNT: 187 10^3/UL (ref 140–415)
POTASSIUM: 4.7 MMOL/L (ref 3.5–5.1)
RED BLOOD COUNT: 4.14 10^6/UL (ref 4.7–6.1)
RED CELL DISTRIBUTION WIDTH: 11.8 % (ref 11.5–14.5)
SODIUM: 137 MMOL/L (ref 135–144)
WHITE BLOOD COUNT: 6.6 10^3/UL (ref 4.8–10.8)

## 2018-02-10 RX ADMIN — LEVOFLOXACIN 1 MLS/HR: 500 INJECTION, SOLUTION INTRAVENOUS at 13:12

## 2018-02-10 RX ADMIN — VALSARTAN 1 MG: 80 TABLET, FILM COATED ORAL at 08:45

## 2018-02-10 RX ADMIN — Medication 1 MG: at 08:45

## 2018-02-10 RX ADMIN — AMOXICILLIN AND CLAVULANATE POTASSIUM 1 MG: 875; 125 TABLET, FILM COATED ORAL at 08:46

## 2018-02-10 RX ADMIN — FOLIC ACID 1 MG: 1 TABLET ORAL at 08:42

## 2018-02-10 RX ADMIN — METHYLPREDNISOLONE SODIUM SUCCINATE 1 MG: 40 INJECTION, POWDER, FOR SOLUTION INTRAMUSCULAR; INTRAVENOUS at 14:28

## 2018-02-10 RX ADMIN — SPIRONOLACTONE 1 MG: 25 TABLET, FILM COATED ORAL at 08:43

## 2018-02-10 RX ADMIN — THIAMINE HCL TAB 100 MG 1 MG: 100 TAB at 08:46

## 2018-02-10 RX ADMIN — IPRATROPIUM BROMIDE AND ALBUTEROL SULFATE 1 ML: .5; 3 SOLUTION RESPIRATORY (INHALATION) at 02:00

## 2018-02-10 RX ADMIN — GUAIFENESIN AND DEXTROMETHORPHAN 1 ML: 100; 10 SYRUP ORAL at 13:16

## 2018-02-10 RX ADMIN — IPRATROPIUM BROMIDE AND ALBUTEROL SULFATE 1 ML: .5; 3 SOLUTION RESPIRATORY (INHALATION) at 07:27

## 2018-02-10 RX ADMIN — METHYLPREDNISOLONE SODIUM SUCCINATE 1 MG: 40 INJECTION, POWDER, FOR SOLUTION INTRAMUSCULAR; INTRAVENOUS at 06:10

## 2018-02-10 RX ADMIN — IPRATROPIUM BROMIDE AND ALBUTEROL SULFATE 1 ML: .5; 3 SOLUTION RESPIRATORY (INHALATION) at 14:44

## 2018-02-10 RX ADMIN — THERA TABS 1 TAB: TAB at 08:46

## 2021-06-18 ENCOUNTER — HOSPITAL ENCOUNTER (EMERGENCY)
Dept: HOSPITAL 12 - ER | Age: 36
Discharge: HOME | End: 2021-06-18
Payer: COMMERCIAL

## 2021-06-18 VITALS — HEIGHT: 74 IN | BODY MASS INDEX: 26.31 KG/M2 | WEIGHT: 205 LBS

## 2021-06-18 VITALS — SYSTOLIC BLOOD PRESSURE: 136 MMHG | DIASTOLIC BLOOD PRESSURE: 81 MMHG

## 2021-06-18 DIAGNOSIS — R03.0: ICD-10-CM

## 2021-06-18 DIAGNOSIS — M54.42: Primary | ICD-10-CM

## 2021-06-18 DIAGNOSIS — Z86.74: ICD-10-CM

## 2021-06-18 DIAGNOSIS — M54.41: ICD-10-CM

## 2021-06-18 PROCEDURE — 99284 EMERGENCY DEPT VISIT MOD MDM: CPT

## 2021-06-18 PROCEDURE — A4663 DIALYSIS BLOOD PRESSURE CUFF: HCPCS

## 2021-06-18 PROCEDURE — 96372 THER/PROPH/DIAG INJ SC/IM: CPT

## 2021-06-22 ENCOUNTER — HOSPITAL ENCOUNTER (EMERGENCY)
Dept: HOSPITAL 12 - ER | Age: 36
Discharge: HOME | End: 2021-06-22
Payer: COMMERCIAL

## 2021-06-22 VITALS — BODY MASS INDEX: 26.31 KG/M2 | WEIGHT: 205 LBS | HEIGHT: 74 IN

## 2021-06-22 DIAGNOSIS — M54.42: Primary | ICD-10-CM

## 2021-06-22 DIAGNOSIS — M54.41: ICD-10-CM

## 2021-06-22 PROCEDURE — 96372 THER/PROPH/DIAG INJ SC/IM: CPT

## 2021-06-22 PROCEDURE — A4663 DIALYSIS BLOOD PRESSURE CUFF: HCPCS

## 2021-06-22 PROCEDURE — 99283 EMERGENCY DEPT VISIT LOW MDM: CPT

## 2021-06-22 NOTE — NUR
Patient discharged to home in stable condition with steady gait.  Written and 
verbal after care instructions given to patient. Patient verbalized 
understanding & compliance of instructions. Stressed follow up with workman's 
compensation medical provider or return to ER for worsening s/s.